# Patient Record
Sex: FEMALE | Employment: UNEMPLOYED | URBAN - METROPOLITAN AREA
[De-identification: names, ages, dates, MRNs, and addresses within clinical notes are randomized per-mention and may not be internally consistent; named-entity substitution may affect disease eponyms.]

---

## 2019-09-15 NOTE — PROGRESS NOTES
Chief Complaint: PAP smear, establish care   Source: self     HPI:  Maria Esther Bonilla is a 53 y.o. female presenting to establish care and for preventative healthcare. Pelvic Pain  - occurs sometimes after sex x 6 months  - also has randomnly at night and feels like a tugging sensation  - periods have become   - last PAP 3 years ago was WNL      Age at which menses began: 13  Last menstrual period was 9/3/19  Length of periods: 3 days   Number of days between periods: irregular every 3 weeks. Previously every 30 days, now every 21-24 days. Menstrual flow: normal, 4 pads in a day; heavier than previously     [id number]    G1 - miscarriage at 5 months  G2 -  at term  G3 - miscarriage at 6 weeks  G4 - miscarriage at 12 weeks  G5 - ectopic at 3 months  with Right tubal ligation    Sexually active?: yes  Number of sexual partners:  one  Type of sexual partners: male  Method of family planning: none    Diet: has breakfast cereal + coffee, lunch - sandwich, dinner - large dinner with meat, rice, beans, tortillas    Exercise: very little, active job cleaning houses       Allergies- reviewed:   No Known Allergies      Medications- reviewed:   No current outpatient medications on file. No current facility-administered medications for this visit.           Past Medical History- reviewed:  Past Medical History:   Diagnosis Date    Hypercholesterolemia     diagnosed at wellness visit ~ ; not on therapy         Past Surgical History- reviewed:   Past Surgical History:   Procedure Laterality Date    HX CHOLECYSTECTOMY      HX GYN      ectopic pregnancy  with R tubal repair    HX ORTHOPAEDIC      bone taken from hip and repaired right arm         Family History - reviewed:  Family History   Problem Relation Age of Onset    Hypertension Mother     Cancer Father     Diabetes Maternal Uncle          Social History - reviewed:  Social History     Socioeconomic History    Marital status: UNKNOWN     Spouse name: Not on file    Number of children: Not on file    Years of education: Not on file    Highest education level: Not on file   Occupational History    Not on file   Social Needs    Financial resource strain: Not on file    Food insecurity:     Worry: Not on file     Inability: Not on file    Transportation needs:     Medical: Not on file     Non-medical: Not on file   Tobacco Use    Smoking status: Former Smoker     Last attempt to quit:      Years since quittin.7    Tobacco comment: infrequently for one week at a time ~ 1 pack/week   Substance and Sexual Activity    Alcohol use: Yes     Comment: 1X per month     Drug use: Not on file    Sexual activity: Yes   Lifestyle    Physical activity:     Days per week: Not on file     Minutes per session: Not on file    Stress: Not on file   Relationships    Social connections:     Talks on phone: Not on file     Gets together: Not on file     Attends [employer]ology service: Not on file     Active member of club or organization: Not on file     Attends meetings of clubs or organizations: Not on file     Relationship status: Not on file    Intimate partner violence:     Fear of current or ex partner: Not on file     Emotionally abused: Not on file     Physically abused: Not on file     Forced sexual activity: Not on file   Other Topics Concern    Not on file   Social History Narrative    Not on file         Immunizations - reviewed:   Immunization History   Administered Date(s) Administered    Influenza Vaccine (Quad) PF 2019     Flu: today  Tdap: last 5 years ago  Pneumovax: not indicated   Zostervax: not indicated       Health Maintenance reviewed -  Pap smear Due today  Mammogram - last one 8 months ago; inconclusive per patient; need to obtain records   Colonoscopy never, not indicated   DEXA scan never, not indicated   HIV testing negative with pregnancies   Hepatitis C testing never, not indicated   Lung cancer screening never, not indicated Review of Systems   Constitutional: Negative for malaise/fatigue. HENT: Negative for congestion and sinus pain. Eyes: Negative for blurred vision and double vision. Respiratory: Negative for cough. Cardiovascular: Negative for chest pain and palpitations. Gastrointestinal: Negative for abdominal pain, constipation and diarrhea. Genitourinary: Negative for dysuria and urgency. Musculoskeletal: Negative for falls and myalgias. Skin: Negative for itching and rash. Hair loss   Neurological: Negative for dizziness, weakness and headaches. Psychiatric/Behavioral: Negative for depression. The patient is not nervous/anxious.     BREASTS: No masses or nipple discharge      Physical Exam  Visit Vitals  /65   Pulse 83   Temp 96.8 °F (36 °C) (Oral)   Resp 16   Ht 5' 0.5\" (1.537 m)   Wt 168 lb (76.2 kg)   SpO2 97%   BMI 32.27 kg/m²       General appearance - alert, well appearing, and in no distress, oriented to person, place, and time and overweight  Eyes - pupils equal and reactive, extraocular eye movements intact  Ears - bilateral TM's and external ear canals normal  Nose - normal and patent, no erythema, discharge or polyps  Mouth - mucous membranes moist, pharynx normal without lesions  Neck - supple, no significant adenopathy, thyroid exam: thyroid is normal in size without nodules or tenderness  Chest - clear to auscultation, no wheezes, rales or rhonchi, symmetric air entry  Heart - normal rate, regular rhythm, normal S1, S2, no murmurs, rubs, clicks or gallops  Abdomen - soft, nontender, nondistended, no masses or organomegaly  Neurological - alert, oriented, normal speech, no focal findings or movement disorder noted  Musculoskeletal - no joint tenderness, deformity or swelling, full range of motion without pain  Extremities - peripheral pulses normal, no pedal edema, no clubbing or cyanosis  Skin - normal coloration and turgor, no rashes, no suspicious skin lesions noted  Pelvic - Exam chaperoned by Mayda Flood LPN. External genitalia normal without rashes or lesions. Pink vaginal mucosa. Moderate rugae of vaginal side walls with scant lubrication. Scant white discharge. Cervix without lesions or abnormal discharge. Uterus non tender and normal size. No adnexal masses or tenderness. Breast exam deferred. The American Cancer Society recommends not performing clinical breast examination, given the potential for false-positive findings and lack of evidence for improved outcomes. The USPSTF states that evidence is insufficient to assess additional benefits of clinical breast examination beyond mammography. Assessment/Plan:    Mrs Nik Juan is a 51yo F without significant past medical history here today to establish care. Current concern about pain with intercourse and at times general vaginal pain as well as more frequent menses. Given patient's age likely pain and abnormal menstruation due to perimenopausal state. UPT today negative. Given history of hair loss with check TSH As well as CBC, Prolactin and TSH/LH. DDX includes uterine abnormality such as fibroid, perimenopause, decrease in vaginal estrogen, abnormal ovulation. Patient to release previous medical records to our office to review necessary health maintenance including recent mammography which was not ordered today because patient had imagining recently at outside facility. ICD-10-CM ICD-9-CM    1. Abnormal uterine bleeding N93.9 626.9 PROLACTIN      TSH 3RD GENERATION      FSH AND LH      CBC W/O DIFF      PROTHROMBIN TIME + INR      AMB POC URINE PREGNANCY TEST, VISUAL COLOR COMPARISON      HEMOGLOBIN A1C WITH EAG      PAP IG, APTIMA HPV AND RFX 16/18,45 ([id number])   2. Wellness examination Z00.00 V70.0 CBC W/O DIFF      LIPID PANEL      METABOLIC PANEL, COMPREHENSIVE      PAP IG, APTIMA HPV AND RFX 16/18,45 ([id number])   3.  Encounter for immunization Z23 V03.89 INFLUENZA VIRUS VAC QUAD,SPLIT,PRESV FREE SYRINGE IM ADMIN INFLUENZA VIRUS VAC       · Counseled re: diet, exercise, healthy lifestyle    · Appropriate labs, vaccines, imaging studies, and referrals ordered as listed above    · Discussed the patient's BMI with her. The BMI follow up plan is as follows: I have counseled this patient on diet and exercise regimens. · The patient was counseled on the dangers of tobacco use, and was advised to quit. Reviewed strategies to maximize success, including written materials. Follow-up and Dispositions    · Return if symptoms worsen or fail to improve. I have discussed the diagnosis with the patient and the intended plan as seen in the above orders. Patient verbalized understanding of the plan and agrees with the plan. The patient has received an after-visit summary and questions were answered concerning future plans. I have discussed medication side effects and warnings with the patient as well. Informed patient to return to the office if new symptoms arise. Patient discussed with Dr. Josie Woodward MD supervising physician.     Maribel Fuentes MD  Family Medicine Resident

## 2019-09-16 ENCOUNTER — OFFICE VISIT (OUTPATIENT)
Dept: FAMILY MEDICINE CLINIC | Age: 44
End: 2019-09-16

## 2019-09-16 ENCOUNTER — HOSPITAL ENCOUNTER (OUTPATIENT)
Dept: LAB | Age: 44
Discharge: HOME | End: 2019-09-16
Payer: SELF-PAY

## 2019-09-16 VITALS
DIASTOLIC BLOOD PRESSURE: 65 MMHG | BODY MASS INDEX: 31.72 KG/M2 | HEIGHT: 61 IN | WEIGHT: 168 LBS | TEMPERATURE: 96.8 F | OXYGEN SATURATION: 97 % | RESPIRATION RATE: 16 BRPM | SYSTOLIC BLOOD PRESSURE: 133 MMHG | HEART RATE: 83 BPM

## 2019-09-16 DIAGNOSIS — R10.2 VAGINAL PAIN: ICD-10-CM

## 2019-09-16 DIAGNOSIS — N93.9 ABNORMAL UTERINE BLEEDING: Primary | ICD-10-CM

## 2019-09-16 DIAGNOSIS — Z00.00 WELLNESS EXAMINATION: ICD-10-CM

## 2019-09-16 DIAGNOSIS — Z23 ENCOUNTER FOR IMMUNIZATION: ICD-10-CM

## 2019-09-16 LAB
HCG URINE, QL. (POC): NEGATIVE
VALID INTERNAL CONTROL?: YES

## 2019-09-16 PROCEDURE — 88175 CYTOPATH C/V AUTO FLUID REDO: CPT

## 2019-09-16 PROCEDURE — 87624 HPV HI-RISK TYP POOLED RSLT: CPT

## 2019-09-16 NOTE — PATIENT INSTRUCTIONS
Please complete a release of medical records form up front. Depending on your lab results I may have you return for followup in the next few weeks. Aprenda acerca de la menopausia - [ Learning About Menopause ]  ¿Qué es la menopausia? Para la mayoría de [facility], la menopausia es un proceso natural de envejecimiento. Los períodos menstruales gradualmente se detienen. La capacidad para quedar [place] Brenda a patricio fin. Algunas mujeres sienten alivio cuando melanie años de maternidad terminan. Anshu otras luchan con los cambios físicos y emocionales que vienen con la menopausia. En la [employer], la menopausia se presenta a alrededor de los 48 Los davina. Anshu el cuerpo de cada john tiene patricio propio desarrollo cronológico. Algunas mujeres poornima de tener melanie períodos en la mitad de la década de los 36 años de [place]. Otras los siguen teniendo hasta usama [employer] 48. Y algunas mujeres pasan por la menopausia antes debido a un tratamiento contra el cáncer o a jluis cirugía para extraer los ovarios. ¿Qué puede esperar con la menopausia? · Comienza con la perimenopausia. [place] es el proceso de cambio que conduce a la menopausia. La perimenopausia puede empezar ya hacia finales de los 30 años o no aparecer hasta principios de los 48. Patricio duración varía, anshu suele durar entre 2 y [address] · Angelo alisia proceso, melanie niveles hormonales ascenderán y descenderán de manera desigual (fluctuarán). [place] causa alteraciones en melanie períodos y otros síntomas. Con el tiempo, los niveles de estrógeno y de progesterona descienden lo suficiente maikel para que el ciclo menstrual se detenga. Un año completo sin tener un período generalmente se considera maikel menopausia. · Los niveles bajos de estrógeno después de la menopausia aceleran la pérdida de masa ósea. [place] aumenta patricio riesgo de osteoporosis. Además, el riesgo de tener jluis enfermedad cardíaca aumenta después de la menopausia.   · Es normal que la piel se afine y esté más reseca y [place] después de la menopausia. El revestimiento vaginal y las vías urinarias inferiores también pierden tejido y se debilitan. [place] puede hacer que sea difícil tener relaciones sexuales. También puede aumentar el riesgo de infecciones vaginales y [place]. ¿Cuáles son los síntomas? · Períodos más ligeros o más abundantes. Patricio ciclo menstrual puede ser Oksana Balk corto. Es posible que se salte períodos. · Bochornos. Puede tener jluis sensación repentina de calor intenso. Puede sudar y [employer] itzel, el johanny y el pecho enrojecidos. Además de bochornos, usted puede tener latidos del corazón demasiado rápidos o irregulares. Puede sentirse ansiosa o malhumorada. En casos raros, puede sentir pánico.  · Problemas para dormir. · Cambios repentinos del estado de ánimo o sentirse malhumorada, deprimida o preocupada. · Problemas para recordar o pensar con claridad. · Sequedad vaginal.  Algunas mujeres tienen solo unos pocos síntomas leves. Otras tienen síntomas graves que perturban el sueño y la marylu diaria. Los síntomas tienden a durar o a empeorar el primer año o más después de la menopausia. Con el tiempo, las hormonas se estabilizan a niveles bajos. Muchos síntomas mejoran o desaparecen. Sin embargo, algunas mujeres pueden tener síntomas que no desaparecen. ¿Cómo se tratan los síntomas de la menopausia?   Si tiene síntomas leves, puede obtener algo de alivio si come alimentos saludables, hace ejercicio y reduce el estrés. Algunas mujeres optan por michelle medicamentos si tienen síntomas graves que no se alivian haciendo cambios en patricio estilo de marylu.   Cambios en el estilo de marylu    · Opte por jluis dieta saludable para el corazón que sea baja en grasas saturadas. Debería incluir mucho pescado, frutas, verduras, frijoles y granos y panes ricos en fibra. Asegúrese de obtener suficiente calcio y vitamina D para ayudar a [facility].  Los productos lácteos bajos en grasa o sin [place] son [place] excelente johnnie de calcio.     · Meryl ejercicio con regularidad. El ejercicio puede ayudarla a manejar patricio peso, mantener el corazón y los huesos maeknzie y levantarle el ánimo.     · Limite la cafeína, el alcohol y el estrés. Estas cosas podrían empeorar los síntomas. Limitar patricio consumo podría ayudarla a dormir mejor.     · Si fuma, deje de hacerlo. Dejar de fumar puede reducir los bochornos y los riesgos para la ryder a Fanny Mejía. Medicamentos   Si melanie síntomas son graves, hable con patricio [place] probar medicamentos recetados, tales maikel:    · Pastillas anticonceptivas de dosis baja antes de la menopausia.     · Terapia hormonal (HT, por melanie siglas en inglés) de dosis baja después de la menopausia.     · Antidepresivos.     · Un medicamento llamado clonidina ([place]), que suele usarse para tratar la presión arterial kade.    Todos los medicamentos para los síntomas de la menopausia tienen posibles riesgos o efectos secundarios. Jluis cantidad muy pequeña de mujeres tienen problemas de ryder graves cuando reciben terapia hormonal. Asegúrese de hablar con patricio médico acerca de los posibles riesgos de ryder antes de comenzar un tratamiento para los síntomas de la menopausia.   Otros tratamientos   Puede probar:    · Ejercicios de respiración. Estos pueden ayudarla a reducir los bochornos y los síntomas emocionales.     · Soya. Algunas mujeres creen que comer mucha soya les ayuda a estabilizar los síntomas de la menopausia.     · Yoga o biorretroalimentación. Estos pueden ayudar a reducir el estrés. La atención de seguimiento es jluis parte clave de patricio tratamiento y seguridad. Asegúrese de hacer y acudir a todas las citas, y llame a patricio médico si está teniendo problemas. También es jluis buena idea saber los resultados de melanie exámenes y mantener jluis lista de los medicamentos que katie. ¿Dónde puede encontrar más información en inglés? Carolyne Andrews a http://sonia-maximus.info/.   Gilbert Spence H199 en la búsqueda para aprender más acerca de \"Aprenda acerca de la menopausia - [ Learning About Menopause ]. \"  Revisado: 19 febrero, 2019  Versión del contenido: 12.1  © 4206-2866 Healthwise, Incorporated. Las instrucciones de cuidado fueron adaptadas bajo licencia por [employer] (which disclaims liability or warranty for this information). Si usted tiene [place] afección médica o sobre estas instrucciones, siempre pregunte a patricio profesional de ryder. Healthwise, Incorporated niega toda garantía o responsabilidad por patricio uso de esta información. Sangrado uterino anormal: Instrucciones de cuidado - [ Abnormal Uterine Bleeding: Care Instructions ]  Instrucciones de cuidado    El sangrado uterino anormal (AUB, por melanie siglas en inglés) es un sangrado irregular del útero que dura más o es más intenso de lo normal o que no ocurre en el momento habitual para usted. A veces, se debe a cambios en los niveles hormonales. También puede estar causado por crecimientos en el útero, tales maikel fibromas o pólipos. A veces no se puede encontrar la causa. Podría tener mucho sangrado cuando no está esperando patricio período. Patricio médico puede sugerirle jluis prueba de embarazo si lucinda que está [place]. La atención de seguimiento es jluis parte clave de patricio tratamiento y seguridad. Asegúrese de hacer y acudir a todas las citas, y llame a patricio médico si está teniendo problemas. También es jluis buena idea saber los resultados de melanie exámenes y mantener jluis lista de los medicamentos que katie. ¿Cómo puede cuidarse en el hogar? · Sea wei con los medicamentos. [place] los analgésicos (medicamentos para el dolor) exactamente maikel le fueron indicados. ? Si el médico le recetó un analgésico, tómelo según las indicaciones. ? Si no está tomando un analgésico recetado, pregúntele a patricio médico si puede michelle philip de [employer]. · Podría faltarle wilmer por la pérdida de ade.  Coma jluis dieta equilibrada con alto contenido de wilmer y vitamina C. Entre los alimentos ricos en wilmer se encuentran las saturnino steven, los [place], los [place], los frijoles (habichuelas) y las verduras de hojas verdes. Pregúntele a patricio médico si necesita michelle pastillas de wilmer o un multivitamínico.  ¿Cuándo debe pedir ayuda? Llame al 911 en cualquier momento que considere que necesita atención de [place]. Por ejemplo, llame si:    · Se desmayó (perdió el conocimiento).    Llame a patricio médico ahora mismo o busque atención médica inmediata si:    · Tiene dolor repentino e intenso en el abdomen o la pelvis.     · Tiene sangrado vaginal intenso. Empapa melanie toallas sanitarias o tampones habituales cada hora constance 2 o más horas.     · Siente mareos o aturdimiento, o que está a punto de desmayarse.    Preste especial atención a los cambios en patricio ryder y asegúrese de comunicarse con patricio médico si:    · Tiene un dolor nuevo en el abdomen o la pelvis.     · Tiene fiebre.     · El sangrado empeora o dura más de 1 semana.     · Piensa que podría estar embarazada. ¿Dónde puede encontrar más información en inglés? Michela Spina a http://sonia-maximus.info/. Raman Cosme O796 en la búsqueda para aprender más acerca de \"Sangrado uterino anormal: Instrucciones de cuidado - [ Abnormal Uterine Bleeding: Care Instructions ]. \"  Revisado: 19 febrero, 2019  Versión del contenido: 12.1  © 7980-8828 Healthwise, Incorporated. Las instrucciones de cuidado fueron adaptadas bajo licencia por [employer] (which disclaims liability or warranty for this information). Si usted tiene [place] afección médica o sobre estas instrucciones, siempre pregunte a patricio profesional de ryder. [facility] niega toda garantía o responsabilidad por patricio uso de esta información.

## 2019-09-16 NOTE — PROGRESS NOTES
Chief Complaint   Patient presents with   [facility]    Pelvic Pain     times 6 months     1. Have you been to the ER, urgent care clinic since your last visit? Hospitalized since your last visit? N/A    2. Have you seen or consulted any other health care providers outside of the [address] [place] [address] since your last visit? Include any pap smears or colon screening.  N/A

## 2019-09-17 LAB
ALBUMIN SERPL-MCNC: 4.3 G/DL (ref 3.5–5.5)
ALBUMIN/GLOB SERPL: 1.6 {RATIO} (ref 1.2–2.2)
ALP SERPL-CCNC: 74 IU/L (ref 39–117)
ALT SERPL-CCNC: 33 IU/L (ref 0–32)
AST SERPL-CCNC: 19 IU/L (ref 0–40)
BILIRUB SERPL-MCNC: 0.2 MG/DL (ref 0–1.2)
BUN SERPL-MCNC: 14 MG/DL (ref 6–24)
BUN/CREAT SERPL: 26 (ref 9–23)
CALCIUM SERPL-MCNC: 9.3 MG/DL (ref 8.7–10.2)
CHLORIDE SERPL-SCNC: 103 MMOL/L (ref 96–106)
CHOLEST SERPL-MCNC: 227 MG/DL (ref 100–199)
CO2 SERPL-SCNC: 21 MMOL/L (ref 20–29)
CREAT SERPL-MCNC: 0.53 MG/DL (ref 0.57–1)
ERYTHROCYTE [DISTWIDTH] IN BLOOD BY AUTOMATED COUNT: 12.9 % (ref 12.3–15.4)
EST. AVERAGE GLUCOSE BLD GHB EST-MCNC: 111 MG/DL
FSH SERPL-ACNC: 8.8 MIU/ML
GLOBULIN SER CALC-MCNC: 2.7 G/DL (ref 1.5–4.5)
GLUCOSE SERPL-MCNC: 90 MG/DL (ref 65–99)
HBA1C MFR BLD: 5.5 % (ref 4.8–5.6)
HCT VFR BLD AUTO: 39.9 % (ref 34–46.6)
HDLC SERPL-MCNC: 45 MG/DL
HGB BLD-MCNC: 13.3 G/DL (ref 11.1–15.9)
INR PPP: 1 (ref 0.8–1.2)
INTERPRETATION, 910389: NORMAL
LDLC SERPL CALC-MCNC: 143 MG/DL (ref 0–99)
LH SERPL-ACNC: 6.2 MIU/ML
MCH RBC QN AUTO: 28.8 PG (ref 26.6–33)
MCHC RBC AUTO-ENTMCNC: 33.3 G/DL (ref 31.5–35.7)
MCV RBC AUTO: 86 FL (ref 79–97)
PLATELET # BLD AUTO: 366 X10E3/UL (ref 150–450)
POTASSIUM SERPL-SCNC: 4.7 MMOL/L (ref 3.5–5.2)
PROLACTIN SERPL-MCNC: 21.5 NG/ML (ref 4.8–23.3)
PROT SERPL-MCNC: 7 G/DL (ref 6–8.5)
PROTHROMBIN TIME: 10.5 SEC (ref 9.1–12)
RBC # BLD AUTO: 4.62 X10E6/UL (ref 3.77–5.28)
SODIUM SERPL-SCNC: 138 MMOL/L (ref 134–144)
TRIGL SERPL-MCNC: 193 MG/DL (ref 0–149)
TSH SERPL DL<=0.005 MIU/L-ACNC: 2.37 UIU/ML (ref 0.45–4.5)
VLDLC SERPL CALC-MCNC: 39 MG/DL (ref 5–40)
WBC # BLD AUTO: 9.1 X10E3/UL (ref 3.4–10.8)

## 2019-09-20 ENCOUNTER — TELEPHONE (OUTPATIENT)
Dept: FAMILY MEDICINE CLINIC | Age: 44
End: 2019-09-20

## 2019-09-20 NOTE — TELEPHONE ENCOUNTER
Called patient to discuss results.  No answer and no opportunity to leave VM    Assisted with phone call by Ester# [id number]

## 2019-09-20 NOTE — PROGRESS NOTES
PAP NILM, HPV neg - repeat in 5 years  Prolactin, TSH, FSH/LH, CBC WNL, Coag studies WNL  Lipid Panel - ASCVD Risk 10 year 1.7% - no need to start statin  CMP WNL  HbA1c 5.5%    Patient called no answer, no VM. Letter sent.

## 2019-10-03 ENCOUNTER — DOCUMENTATION ONLY (OUTPATIENT)
Dept: FAMILY MEDICINE CLINIC | Age: 44
End: 2019-10-03

## 2019-10-03 NOTE — PROGRESS NOTES
Review of Previous Medical Records  - PAP 2015 NILM + HPV neg  - 2015 mammogram without concerning findings per patient   - 4/ 2019 mammogram without evidence of malignancy- UTD  - negative HIV testing 4/2017

## 2021-11-19 ENCOUNTER — TRANSCRIBE ORDER (OUTPATIENT)
Dept: SCHEDULING | Age: 46
End: 2021-11-19

## 2021-11-19 DIAGNOSIS — N63.10 BREAST MASS, RIGHT: Primary | ICD-10-CM

## 2021-11-22 ENCOUNTER — TELEPHONE (OUTPATIENT)
Dept: FAMILY PLANNING/WOMEN'S HEALTH CLINIC | Age: 46
End: 2021-11-22

## 2021-11-23 ENCOUNTER — HOSPITAL ENCOUNTER (OUTPATIENT)
Dept: LAB | Age: 46
Discharge: HOME | End: 2021-11-23

## 2021-11-23 ENCOUNTER — OFFICE VISIT (OUTPATIENT)
Dept: FAMILY PLANNING/WOMEN'S HEALTH CLINIC | Age: 46
End: 2021-11-23

## 2021-11-23 ENCOUNTER — HOSPITAL ENCOUNTER (OUTPATIENT)
Dept: MAMMOGRAPHY | Age: 46
Discharge: HOME | End: 2021-11-23

## 2021-11-23 DIAGNOSIS — N63.10 BREAST MASS, RIGHT: ICD-10-CM

## 2021-11-23 DIAGNOSIS — Z01.419 ENCOUNTER FOR GYNECOLOGICAL EXAMINATION: ICD-10-CM

## 2021-11-23 DIAGNOSIS — Z12.31 SCREENING MAMMOGRAM FOR BREAST CANCER: Primary | ICD-10-CM

## 2021-11-23 PROCEDURE — 87624 HPV HI-RISK TYP POOLED RSLT: CPT

## 2021-11-23 PROCEDURE — 88175 CYTOPATH C/V AUTO FLUID REDO: CPT

## 2021-11-23 PROCEDURE — 76642 ULTRASOUND BREAST LIMITED: CPT

## 2021-11-23 PROCEDURE — 77062 BREAST TOMOSYNTHESIS BI: CPT

## 2021-11-23 NOTE — PROGRESS NOTES
HISTORY OF PRESENT ILLNESS  Mariia Yepez is a 48 y.o. female here for EWL. HPI Ms Jefferson Avila, had a lump in her right breast but she cannot feel it now. Her last mammogram was 3 years ago at [facility]. She brought those records with her. She denies bilateral nipple discharge, denies skin changes/dimpling/retraction. LMP 11/4/2021. Denies use of hormones. She denies pelvic pain and bloating. She gets on/off white vaginal discharge. She will try OTC Monistat. She has had some urine stress incontinence lately as well. Review of Systems   Constitutional: Negative. Respiratory: Negative. Cardiovascular: Negative. Gastrointestinal: Negative. Genitourinary: Negative. Skin: Negative. Physical Exam  Nursing note reviewed. Constitutional:       Appearance: Normal appearance. Chest:   Breasts:      Right: No swelling, bleeding, inverted nipple, mass, nipple discharge, skin change, tenderness, axillary adenopathy or supraclavicular adenopathy. Left: No swelling, bleeding, inverted nipple, mass, nipple discharge, skin change, tenderness, axillary adenopathy or supraclavicular adenopathy. Genitourinary:     Labia:         Right: No rash, tenderness or lesion. Left: No rash, tenderness or lesion. Urethra: No prolapse, urethral pain, urethral swelling or urethral lesion. Vagina: Normal.      Cervix: Normal.      Uterus: Normal.       Adnexa: Right adnexa normal and left adnexa normal.        Right: No mass or tenderness. Left: No mass or tenderness. Rectum: Normal. Guaiac result negative. Lymphadenopathy:      Upper Body:      Right upper body: No supraclavicular, axillary or pectoral adenopathy. Left upper body: No supraclavicular, axillary or pectoral adenopathy. Lower Body: No right inguinal adenopathy. No left inguinal adenopathy. Skin:     General: Skin is warm and dry.    Neurological:      Mental Status: She is alert and oriented to person, place, and time. Psychiatric:         Mood and Affect: Mood normal.         Behavior: Behavior normal.         Thought Content: Thought content normal.         Judgment: Judgment normal.         ASSESSMENT and PLAN  1. EWL  2. CBE benign      -unable to palpate a mass in the right breast  3. Screening mammogram today  4. Pap Thin Prep w/HPV cotesting obtained  5. Pelvic floor exercises/Kegal discussed  6.  CVAN info provided

## 2021-11-23 NOTE — PROGRESS NOTES
EVERY WOMANS LIFE HISTORY QUESTIONNAIRE       No Yes Comments   Has a doctor ever seen or felt anything wrong with your breast? []                                  [x]                                  Pt felt something to right breast, saw  and they confirmed, lump to right breast   Have you ever had a breast biopsy? [x]                                  []                                          When and where was last mammogram performed? 3 years. Pt brought disc from [facility] with her today. Have you ever been told that there was a problem on your mammogram?   No Yes Comments   [x]                                  []                                       Do you have breast implants? No Yes Comments   [x]                                  []                                       When was your last Pap test performed? 2017    Have you ever had an abnormal Pap test?   No Yes Comments   [x]                                  []                                       Have you had a hysterectomy? No Yes Comments (why)   [x]                                  []                                       Have you been through menopause? No Yes Date of LMP   [x]                                  []                                  11/4/21     Did your mother take LORENA? No Yes Unknown   [x]                                  []                                       Do you have a history of HIV exposure? No Yes    [x]                                  []                                       Have you ever been diagnosed with any type of Cancer   No Yes Comments (type,when,where,type of treatment   []                                  []                                          Has a family member been diagnosed with breast or ovarian cancer?    No Yes Comments (which family members, and type   [x]                                  []                                       Are you taking hormone replacement therapy (HRT)     No Yes Comments   []                                  []                                       How many times have you been pregnant? 4        Number of live births ? 1    Are you experiencing any of the following? No Yes Comments   Nipple Discharge [x]                                  []                                     Breast Lump/Masses []                                  [x]                                  Right breast   Breast Skin Changes [x]                                  []                                          No Yes Comments   Vaginal Discharge []                                  [x]                                  Foul odor, some white discharge but also has urine leak. Pt not able to hold urine. No pain with urination. Lower back pain. Pain to rectal area. Abnormal/unusual vaginal bleeding [x]                                  []                                         Are you experiencing any other health problems? Pain to both breasts to starting period. Pt states she did not feel good, had breast pain and felt lump to right breast.    Age at first period?  13  Age at first birth?17    Ht--5'1\"    Wt--170lbs

## 2021-11-24 ENCOUNTER — TELEPHONE (OUTPATIENT)
Dept: FAMILY PLANNING/WOMEN'S HEALTH CLINIC | Age: 46
End: 2021-11-24

## 2021-11-24 NOTE — TELEPHONE ENCOUNTER
Patient was called to follow up on needing breast biopsy. Pt states that she is a ware of the breast US and Dx mammogram  Results. Is aware that someone from the mammogram suite will be calling to schedule the breast biopsy.    Patient thankful for the [facility] program. Yogesh Mendoza RN

## 2021-11-29 DIAGNOSIS — N63.0 BREAST LUMP: Primary | ICD-10-CM

## 2021-12-09 ENCOUNTER — HOSPITAL ENCOUNTER (OUTPATIENT)
Dept: MAMMOGRAPHY | Age: 46
Discharge: HOME | End: 2021-12-09

## 2021-12-09 DIAGNOSIS — R92.8 ABNORMAL MAMMOGRAM: ICD-10-CM

## 2021-12-09 DIAGNOSIS — N63.0 BREAST LUMP: ICD-10-CM

## 2021-12-09 PROCEDURE — 77061 BREAST TOMOSYNTHESIS UNI: CPT

## 2021-12-09 PROCEDURE — 88305 TISSUE EXAM BY PATHOLOGIST: CPT

## 2021-12-09 PROCEDURE — 77030041975 US BX BREAST RT 1ST LESION W/CLIP AND SPECIMEN

## 2021-12-16 ENCOUNTER — TELEPHONE (OUTPATIENT)
Dept: SURGERY | Age: 46
End: 2021-12-16

## 2021-12-16 ENCOUNTER — OFFICE VISIT (OUTPATIENT)
Dept: SURGERY | Age: 46
End: 2021-12-16
Payer: COMMERCIAL

## 2021-12-16 VITALS
HEIGHT: 61 IN | SYSTOLIC BLOOD PRESSURE: 127 MMHG | WEIGHT: 174 LBS | DIASTOLIC BLOOD PRESSURE: 94 MMHG | BODY MASS INDEX: 32.85 KG/M2 | HEART RATE: 84 BPM

## 2021-12-16 DIAGNOSIS — N63.10 BREAST MASS, RIGHT: Primary | ICD-10-CM

## 2021-12-16 PROCEDURE — 99203 OFFICE O/P NEW LOW 30 MIN: CPT | Performed by: SURGERY

## 2021-12-16 NOTE — PROGRESS NOTES
HISTORY OF PRESENT ILLNESS  Kerri Mauricio is a 48 y.o. female. HPI NEW patient consult referred by Dr. Mj Vyas for RIGHT breast mass which pt noticed 6 weeks ago. Pt had a mammogram /US and biopsy which shows PASH. Bruised from the biopsy. [facility] [place] interpreted. Family History: Mother  from stomach cancer  2021    Father had thyroid cancer,  from other causes     [facility] Results (most recent):  Results from [place] encounter on 21    [facility] 3D RAFAEL W MAMMO RT DX INCL CAD    Narrative  ULTRASOUND-GUIDED RIGHT BREAST CORE BIOPSY WITH CLIP PLACEMENT AND DIGITAL  UNILATERAL RAFAEL SYNTHESIS RIGHT MAMMOGRAM:    INDICATION: Right breast 8:00 mass. Mrs Angela Valles  placed LPO on the ultrasound table . Right breast skin cleansed with Chloro-prep and draped in sterile fashion. Local and deep tissue anesthesia  obtained with subcutaneous injection of 10 cc of 1% lidocaine with epinephrine. A small skin incision was made through which the 14-gauge Sertera needle was placed. Using ultrasound guidance 3, 14 gauge core biopsies were obtained and placed in formalin. . The biopsy needle was withdrawn. A microclip was placed  with the introducer, and introducer withdrawn. Hemostasis obtained. Wound cleansed and dressed. Estimated blood loss: 2 mL . Images of the procedure saved in the PACs. Post-procedural mammogram demonstrates clip at the expected site. Mrs Angela Valles  tolerated the procedure well without immediate complications. She  was given written and verbal post-care instructions. 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 Histology and mammography/sonography are concordant. Histology cannot be high  risk, recommend surgical referral for consideration of excision. RESULT CODE: ACR BIRADS category 2 benign.     FOLLOWUP CODE: Surgical referral.    Past Medical History:   Diagnosis Date    Chronic pain     pain in stomach from gallbladder stones    Ill-defined condition     elevated cholesterol     Past Surgical History:   Procedure Laterality Date    HX GYN  2004    ectopic pregnancy    HX ORTHOPAEDIC  2001    fx R arm    HX OTHER SURGICAL      liposuction      OB History        1    Para   1    Term   1            AB        Living           SAB        IAB        Ectopic        Molar        Multiple        Live Births   1          Obstetric Comments   Menarche 13, LMP 12/10/21, # of children 1, age of 4st delivery 21, Hysterectomy/oophorectomy no/no, Breast bx yes RIGHT breast 19, history of breast feeding no, BCP no, Hormone therapy no           No family history on file. Social History     Tobacco Use    Smoking status: Never Smoker    Smokeless tobacco: Not on file   Substance Use Topics    Alcohol use: No      Prior to Admission medications    Not on File      No Known Allergies      Review of Systems   Constitutional: Positive for malaise/fatigue. HENT: Positive for hearing loss. Eyes: Positive for blurred vision. Gastrointestinal: Positive for abdominal pain and diarrhea. All other systems reviewed and are negative. Physical Exam  Constitutional:       Appearance: She is well-developed. Cardiovascular:      Rate and Rhythm: Normal rate and regular rhythm. Heart sounds: Normal heart sounds. Pulmonary:      Effort: Pulmonary effort is normal.      Breath sounds: Normal breath sounds. Chest:   Breasts: Breasts are symmetrical.      Right: No swelling, mass, nipple discharge, skin change, tenderness, axillary adenopathy or supraclavicular adenopathy. Left: No swelling, mass, nipple discharge, skin change, tenderness, axillary adenopathy or supraclavicular adenopathy. Lymphadenopathy:      Upper Body:      Right upper body: No supraclavicular or axillary adenopathy. Left upper body: No supraclavicular or axillary adenopathy. Skin:     General: Skin is warm and dry. Neurological:      Mental Status: She is alert and oriented to person, place, and time. ASSESSMENT and PLAN    ICD-10-CM ICD-9-CM    1. Breast mass, right  N63.10 611.72      Total time spent with patient: 30 minutes     RIGHT breast mass, SAW     Discussed surgery and post operative expectations:  a. Outpatient surgery with sedation. b. Surgery will take about 45 minutes, then an hour in the recovery room. c. The wound is closed with dissolving sutures and skin glue. It is okay to shower after surgery. d. Swelling and bruising are normal and can last up to 2 weeks. e.  May resume normal activities the day after surgery, but may have 1-2 weeks of pain and/or fatigue  f. Pain is usually well-controlled with ibuprofen or acetaminophen. Hydrocodone may be prescribed if needed. g. Infection and bleeding are unlikely but possible complications.     Plan:  RIGHT breast excisional biopsy, January 2022

## 2021-12-16 NOTE — LETTER
12/16/2021    Patient: Kassy Laboy   YOB: 1975   Date of Visit: 12/16/2021     Abdi Wilson MD  [facility]  [facility]    Dear Abdi Wilson MD,      Thank you for referring Ms. Kassy Laboy to [address] for evaluation. My notes for this consultation are attached. If you have questions, please do not hesitate to call me. I look forward to following your patient along with you.       Sincerely,    Concepcion Varela MD

## 2021-12-20 DIAGNOSIS — N63.10 MASS OF RIGHT BREAST: Primary | ICD-10-CM

## 2021-12-21 NOTE — TELEPHONE ENCOUNTER
Patient notified of surgery :  Patient Surgery Information Sheet        Patient Name:  Kyung Gurrola  Surgery Date:  January 5, 2022  Type of Surgery:  Excisional biopsy of right breast.  Time of Surgery:  9:15 am  Pre-Operative Testing Department will call prior to surgery to determine if you need to come in and will schedule if needed. Arrival Time on the day of Surgery: 8:00 am    Hospital:  [facility] [facility]    Check in is located:   [facility] [[facility]]-go to main entrance , take elevator on left side  past volunteer desk  to 2nd floor, turn right out of elevator, sign in at desk    [address]    Pre-Operative Instructions:       Nothing to eat or drink after midnight the night before surgery  Do not wear makeup, lotion, deodorant, perfume  Please have a  to take you home from the hospital, failure to have a  could result in canceled surgery  All valuables should be left at home, the hospital Is not responsible for valuables  Special Instructions if needed:

## 2021-12-30 ENCOUNTER — HOSPITAL ENCOUNTER (OUTPATIENT)
Dept: PREADMISSION TESTING | Age: 46
Discharge: HOME | End: 2021-12-30

## 2021-12-30 PROCEDURE — U0005 INFEC AGEN DETEC AMPLI PROBE: HCPCS

## 2022-01-01 LAB
SARS-COV-2, XPLCVT: DETECTED
SOURCE, COVRS: ABNORMAL

## 2022-01-03 ENCOUNTER — TELEPHONE (OUTPATIENT)
Dept: SURGERY | Age: 47
End: 2022-01-03

## 2022-01-03 NOTE — TELEPHONE ENCOUNTER
----- Message from Deronda Saint, NP sent at 1/2/2022 10:41 PM EST -----  Wanted to let you know that she tested positive for COVID and will need her surgery postponed unless she is an urgent case. Thanks.

## 2022-01-18 DIAGNOSIS — N63.10 MASS OF RIGHT BREAST: Primary | ICD-10-CM

## 2022-01-21 NOTE — TELEPHONE ENCOUNTER
Patient notified to arrive at 9:30 am [facility] 1/26/22;  Nothing to eat or drink after midnight the night before;  nothing on the skin; will need a  to be released.

## 2022-01-21 NOTE — PERIOP NOTES
[address] \[facility] [place] [facility]\"" [address], [address] [facility]   MAIN OR                                  [phone]   MAIN PRE OP                          [phone]                                                                                AMBULATORY PRE OP          [phone]  PRE-ADMISSION TESTING    [phone]   Surgery Date: Wednesday 1/26/22       Is surgery arrival time given by surgeon? YES BY DR Kaitlyn Diggs OFFICE  If Sandra us staff will call you between 3 and 7pm the day before your surgery with your arrival time. (If your surgery is on a Monday, we will call you the Friday before.)    Call [phone] after 7pm Monday-Friday if you did not receive this call. INSTRUCTIONS BEFORE YOUR SURGERY   When You  Arrive Arrive at the 2nd [address] [facility] on the day of your surgery  Have your insurance card, photo ID, and any copayment (if needed)   Food   and   Drink NO food or drink after midnight the night before surgery    This means NO water, gum, mints, coffee, juice, etc.  No alcohol (beer, wine, liquor) 24 hours before and after surgery   Medications to   TAKE   Morning of Surgery MEDICATIONS TO TAKE THE MORNING OF SURGERY WITH A SIP OF WATER:    NONE   Medications  To  STOP      7 days before surgery  Non-Steroidal anti-inflammatory Drugs (NSAID's): for example, Ibuprofen (Advil, Motrin), Naproxen (Aleve)   Aspirin, if taking for pain    Herbal supplements, vitamins, and fish oil   Other:  (Pain medications not listed above, including Tylenol may be taken)   Blood  Thinners  If you take  Aspirin, Plavix, Coumadin, or any blood-thinning or anti-blood clot medicine, talk to the doctor who prescribed the medications for pre-operative instructions.    Bathing Clothing  Jewelry  Valuables      If you shower the morning of surgery, please do not apply anything to your skin (lotions, powders, deodorant, or makeup, especially mascara)   Do not shave or trim anywhere 24 hours before surgery   Wear your hair loose or down; no pony-tails, buns, or metal hair clips   Wear loose, comfortable, clean clothes   Wear glasses instead of contacts  [employer] money, valuables, and jewelry, including body piercings, at home   If you were given an [employer], bring it on day of surgery. Going Home - or Spending the Night  SAME-DAY SURGERY: You must have a responsible adult drive you home and stay with you 24 hours after surgery   ADMITS: If your doctor is keeping you in the hospital after surgery, leave personal belongings/luggage in your car until you have a hospital room number. Hospital discharge time is 12 noon  Drivers must be here before 12 noon unless you are told differently   Special Instructions NONE       Follow all instructions so your surgery wont be cancelled. Please, be on time. If a situation occurs and you are delayed the day of surgery, call [phone] or [phone]. If your physical condition changes (like a fever, cold, flu, etc.) call your surgeon. Home medication(s) reviewed and verified via   PHONE   LIST   [facility]BAL   during PAT appointment. The patient was contacted by  PHONE    IN-PERSON  The patient verbalizes understanding of all instructions and   DOES   DOES NOT   need reinforcement.

## 2022-01-24 DIAGNOSIS — N63.10 BREAST MASS, RIGHT: Primary | ICD-10-CM

## 2022-02-08 ENCOUNTER — ANESTHESIA EVENT (OUTPATIENT)
Dept: SURGERY | Age: 47
End: 2022-02-08
Payer: COMMERCIAL

## 2022-02-09 ENCOUNTER — ANESTHESIA (OUTPATIENT)
Dept: SURGERY | Age: 47
End: 2022-02-09
Payer: COMMERCIAL

## 2022-02-09 ENCOUNTER — HOSPITAL ENCOUNTER (OUTPATIENT)
Age: 47
Setting detail: OUTPATIENT SURGERY
Discharge: HOME | End: 2022-02-09
Attending: SURGERY | Admitting: SURGERY
Payer: COMMERCIAL

## 2022-02-09 VITALS
DIASTOLIC BLOOD PRESSURE: 70 MMHG | TEMPERATURE: 97.6 F | OXYGEN SATURATION: 99 % | HEIGHT: 61 IN | RESPIRATION RATE: 15 BRPM | HEART RATE: 67 BPM | WEIGHT: 179.01 LBS | BODY MASS INDEX: 33.8 KG/M2 | SYSTOLIC BLOOD PRESSURE: 131 MMHG

## 2022-02-09 DIAGNOSIS — N63.10 MASS OF RIGHT BREAST: ICD-10-CM

## 2022-02-09 DIAGNOSIS — N63.10 BREAST MASS, RIGHT: ICD-10-CM

## 2022-02-09 LAB — HCG UR QL: NEGATIVE

## 2022-02-09 PROCEDURE — 74011000250 HC RX REV CODE- 250: Performed by: NURSE ANESTHETIST, CERTIFIED REGISTERED

## 2022-02-09 PROCEDURE — 88307 TISSUE EXAM BY PATHOLOGIST: CPT

## 2022-02-09 PROCEDURE — 74011000250 HC RX REV CODE- 250: Performed by: SURGERY

## 2022-02-09 PROCEDURE — 76030000000 HC AMB SURG OR TIME 0.5 TO 1: Performed by: SURGERY

## 2022-02-09 PROCEDURE — 81025 URINE PREGNANCY TEST: CPT

## 2022-02-09 PROCEDURE — 77030040922 HC BLNKT HYPOTHRM STRY -A

## 2022-02-09 PROCEDURE — 2709999900 HC NON-CHARGEABLE SUPPLY: Performed by: SURGERY

## 2022-02-09 PROCEDURE — 77030040361 HC SLV COMPR DVT MDII -B

## 2022-02-09 PROCEDURE — 76210000034 HC AMBSU PH I REC 0.5 TO 1 HR: Performed by: SURGERY

## 2022-02-09 PROCEDURE — 76060000061 HC AMB SURG ANES 0.5 TO 1 HR: Performed by: SURGERY

## 2022-02-09 PROCEDURE — 74011250636 HC RX REV CODE- 250/636: Performed by: NURSE ANESTHETIST, CERTIFIED REGISTERED

## 2022-02-09 PROCEDURE — 19120 REMOVAL OF BREAST LESION: CPT | Performed by: SURGERY

## 2022-02-09 PROCEDURE — 76210000046 HC AMBSU PH II REC FIRST 0.5 HR: Performed by: SURGERY

## 2022-02-09 PROCEDURE — 74011250636 HC RX REV CODE- 250/636: Performed by: ANESTHESIOLOGY

## 2022-02-09 RX ORDER — PROPOFOL 10 MG/ML
INJECTION, EMULSION INTRAVENOUS
Status: DISCONTINUED | OUTPATIENT
Start: 2022-02-09 | End: 2022-02-09 | Stop reason: HOSPADM

## 2022-02-09 RX ORDER — ONDANSETRON 2 MG/ML
4 INJECTION INTRAMUSCULAR; INTRAVENOUS AS NEEDED
Status: DISCONTINUED | OUTPATIENT
Start: 2022-02-09 | End: 2022-02-09 | Stop reason: HOSPADM

## 2022-02-09 RX ORDER — ONDANSETRON 2 MG/ML
INJECTION INTRAMUSCULAR; INTRAVENOUS AS NEEDED
Status: DISCONTINUED | OUTPATIENT
Start: 2022-02-09 | End: 2022-02-09 | Stop reason: HOSPADM

## 2022-02-09 RX ORDER — SODIUM CHLORIDE 0.9 % (FLUSH) 0.9 %
5-40 SYRINGE (ML) INJECTION EVERY 8 HOURS
Status: DISCONTINUED | OUTPATIENT
Start: 2022-02-09 | End: 2022-02-09 | Stop reason: HOSPADM

## 2022-02-09 RX ORDER — LIDOCAINE HYDROCHLORIDE 10 MG/ML
0.1 INJECTION, SOLUTION EPIDURAL; INFILTRATION; INTRACAUDAL; PERINEURAL AS NEEDED
Status: DISCONTINUED | OUTPATIENT
Start: 2022-02-09 | End: 2022-02-09 | Stop reason: HOSPADM

## 2022-02-09 RX ORDER — MIDAZOLAM HYDROCHLORIDE 1 MG/ML
INJECTION, SOLUTION INTRAMUSCULAR; INTRAVENOUS AS NEEDED
Status: DISCONTINUED | OUTPATIENT
Start: 2022-02-09 | End: 2022-02-09 | Stop reason: HOSPADM

## 2022-02-09 RX ORDER — HYDROCODONE BITARTRATE AND ACETAMINOPHEN 5; 325 MG/1; MG/1
1 TABLET ORAL
Qty: 10 TABLET | Refills: 0 | Status: SHIPPED | OUTPATIENT
Start: 2022-02-09 | End: 2022-02-14

## 2022-02-09 RX ORDER — SODIUM CHLORIDE, SODIUM LACTATE, POTASSIUM CHLORIDE, CALCIUM CHLORIDE 600; 310; 30; 20 MG/100ML; MG/100ML; MG/100ML; MG/100ML
125 INJECTION, SOLUTION INTRAVENOUS CONTINUOUS
Status: DISCONTINUED | OUTPATIENT
Start: 2022-02-09 | End: 2022-02-09 | Stop reason: HOSPADM

## 2022-02-09 RX ORDER — FLUMAZENIL 0.1 MG/ML
0.2 INJECTION INTRAVENOUS
Status: DISCONTINUED | OUTPATIENT
Start: 2022-02-09 | End: 2022-02-09 | Stop reason: HOSPADM

## 2022-02-09 RX ORDER — LIDOCAINE HYDROCHLORIDE 20 MG/ML
INJECTION, SOLUTION INFILTRATION; PERINEURAL AS NEEDED
Status: DISCONTINUED | OUTPATIENT
Start: 2022-02-09 | End: 2022-02-09 | Stop reason: HOSPADM

## 2022-02-09 RX ORDER — FENTANYL CITRATE 50 UG/ML
INJECTION, SOLUTION INTRAMUSCULAR; INTRAVENOUS AS NEEDED
Status: DISCONTINUED | OUTPATIENT
Start: 2022-02-09 | End: 2022-02-09 | Stop reason: HOSPADM

## 2022-02-09 RX ORDER — HYDROMORPHONE HYDROCHLORIDE 1 MG/ML
.25-1 INJECTION, SOLUTION INTRAMUSCULAR; INTRAVENOUS; SUBCUTANEOUS
Status: DISCONTINUED | OUTPATIENT
Start: 2022-02-09 | End: 2022-02-09 | Stop reason: HOSPADM

## 2022-02-09 RX ORDER — SODIUM CHLORIDE, SODIUM LACTATE, POTASSIUM CHLORIDE, CALCIUM CHLORIDE 600; 310; 30; 20 MG/100ML; MG/100ML; MG/100ML; MG/100ML
100 INJECTION, SOLUTION INTRAVENOUS CONTINUOUS
Status: DISCONTINUED | OUTPATIENT
Start: 2022-02-09 | End: 2022-02-09 | Stop reason: HOSPADM

## 2022-02-09 RX ORDER — BUPIVACAINE HYDROCHLORIDE AND EPINEPHRINE 5; 5 MG/ML; UG/ML
30 INJECTION, SOLUTION EPIDURAL; INTRACAUDAL; PERINEURAL
Status: COMPLETED | OUTPATIENT
Start: 2022-02-09 | End: 2022-02-09

## 2022-02-09 RX ORDER — NALOXONE HYDROCHLORIDE 0.4 MG/ML
0.2 INJECTION, SOLUTION INTRAMUSCULAR; INTRAVENOUS; SUBCUTANEOUS
Status: DISCONTINUED | OUTPATIENT
Start: 2022-02-09 | End: 2022-02-09 | Stop reason: HOSPADM

## 2022-02-09 RX ORDER — DIPHENHYDRAMINE HYDROCHLORIDE 50 MG/ML
12.5 INJECTION, SOLUTION INTRAMUSCULAR; INTRAVENOUS AS NEEDED
Status: DISCONTINUED | OUTPATIENT
Start: 2022-02-09 | End: 2022-02-09 | Stop reason: HOSPADM

## 2022-02-09 RX ORDER — SODIUM CHLORIDE 0.9 % (FLUSH) 0.9 %
5-40 SYRINGE (ML) INJECTION AS NEEDED
Status: DISCONTINUED | OUTPATIENT
Start: 2022-02-09 | End: 2022-02-09 | Stop reason: HOSPADM

## 2022-02-09 RX ORDER — DEXAMETHASONE SODIUM PHOSPHATE 4 MG/ML
INJECTION, SOLUTION INTRA-ARTICULAR; INTRALESIONAL; INTRAMUSCULAR; INTRAVENOUS; SOFT TISSUE AS NEEDED
Status: DISCONTINUED | OUTPATIENT
Start: 2022-02-09 | End: 2022-02-09 | Stop reason: HOSPADM

## 2022-02-09 RX ORDER — BUPIVACAINE HYDROCHLORIDE AND EPINEPHRINE 5; 5 MG/ML; UG/ML
INJECTION, SOLUTION EPIDURAL; INTRACAUDAL; PERINEURAL AS NEEDED
Status: DISCONTINUED | OUTPATIENT
Start: 2022-02-09 | End: 2022-02-09 | Stop reason: HOSPADM

## 2022-02-09 RX ORDER — OXYCODONE HYDROCHLORIDE 5 MG/1
5 TABLET ORAL AS NEEDED
Status: DISCONTINUED | OUTPATIENT
Start: 2022-02-09 | End: 2022-02-09 | Stop reason: HOSPADM

## 2022-02-09 RX ORDER — PROPOFOL 10 MG/ML
INJECTION, EMULSION INTRAVENOUS AS NEEDED
Status: DISCONTINUED | OUTPATIENT
Start: 2022-02-09 | End: 2022-02-09 | Stop reason: HOSPADM

## 2022-02-09 RX ADMIN — FENTANYL CITRATE 25 MCG: 50 INJECTION INTRAMUSCULAR; INTRAVENOUS at 12:07

## 2022-02-09 RX ADMIN — PROPOFOL 16 MG: 10 INJECTION, EMULSION INTRAVENOUS at 12:10

## 2022-02-09 RX ADMIN — ONDANSETRON HYDROCHLORIDE 4 MG: 2 SOLUTION INTRAMUSCULAR; INTRAVENOUS at 12:23

## 2022-02-09 RX ADMIN — FENTANYL CITRATE 50 MCG: 50 INJECTION INTRAMUSCULAR; INTRAVENOUS at 11:49

## 2022-02-09 RX ADMIN — LIDOCAINE HYDROCHLORIDE 80 MG: 20 INJECTION, SOLUTION INFILTRATION; PERINEURAL at 11:54

## 2022-02-09 RX ADMIN — DEXAMETHASONE SODIUM PHOSPHATE 8 MG: 4 INJECTION, SOLUTION INTRAMUSCULAR; INTRAVENOUS at 11:47

## 2022-02-09 RX ADMIN — PROPOFOL 150 MCG/KG/MIN: 10 INJECTION, EMULSION INTRAVENOUS at 12:00

## 2022-02-09 RX ADMIN — PROPOFOL 125 MCG/KG/MIN: 10 INJECTION, EMULSION INTRAVENOUS at 11:57

## 2022-02-09 RX ADMIN — HYDROMORPHONE HYDROCHLORIDE 0.5 MG: 1 INJECTION, SOLUTION INTRAMUSCULAR; INTRAVENOUS; SUBCUTANEOUS at 12:56

## 2022-02-09 RX ADMIN — PROPOFOL 16 MG: 10 INJECTION, EMULSION INTRAVENOUS at 12:00

## 2022-02-09 RX ADMIN — SODIUM CHLORIDE, POTASSIUM CHLORIDE, SODIUM LACTATE AND CALCIUM CHLORIDE 125 ML/HR: 600; 310; 30; 20 INJECTION, SOLUTION INTRAVENOUS at 11:41

## 2022-02-09 RX ADMIN — PROPOFOL 40 MG: 10 INJECTION, EMULSION INTRAVENOUS at 11:56

## 2022-02-09 RX ADMIN — FENTANYL CITRATE 25 MCG: 50 INJECTION INTRAMUSCULAR; INTRAVENOUS at 12:04

## 2022-02-09 RX ADMIN — MIDAZOLAM 2 MG: 1 INJECTION, SOLUTION INTRAMUSCULAR; INTRAVENOUS at 11:49

## 2022-02-09 NOTE — DISCHARGE INSTRUCTIONS
Discharge Instructions from Dr. Isabell Fonseca    Patient Discharge Instructions    Donya Marks / [id number] : 1975    Admitted 2022 Discharged: 2022   What to do at Home  Diet:Regular  Activity: As tolerated. No driving if taking pain medication. Okay to shower or take a bath. You may chose to wear a bra to sleep in for extra support for the next few days. Pain control: Ice pack 20 minutes of every hour until you go to bed tonight. You may use over-the-counter medication as needed (acetominophen, aspirin, ibuprofen). Tomorrow you may put a heat pack on the breast.  Dressing: The skin glue is waterproof. It is okay to wash normally at this site. If the glue is still present after 10 days you should peel it off. Follow up: If needed, call the office to make an appointment. [phone]. I will call with results within one week. Problems/Questions: Call Jordana Calles MD on my cell phone. [phone]          DISCHARGE SUMMARY from Your Nurse    PATIENT INSTRUCTIONS:    AFTER ANESTHESIA & SEDATION, and WHILE TAKING PAIN MEDICINE  After general anesthesia / intravenous sedation and the 24 hours following, and/or while taking prescription Opiates:  · Limit your activities  · Do not drive and operate hazardous machinery until you have been of all narcotics and sedatives for over 24 hours  · Do not make important personal or business decisions  · Do  not drink alcoholic beverages  · If you have not urinated within 8 hours after discharge, please contact your surgeon on call.         SIGNS OF INFECTION, THINGS TO REPORT TO YOUR DOCTOR  Report the following Signs of Infection or General Problems after surgery to your surgeon:  · Excessive pain, swelling, redness, drainage, pus or odor of or around the surgical area  · Fever/ temperature over 101; Temperature over 100 if on medications (chemotherapy or medicines which affect your ability to fight infections)  · Nausea and vomiting lasting longer than 4 hours or if unable to take medications  · Any signs of decreased circulation or nerve impairment to extremity: change in color, persistent  numbness, tingling, coldness or increase pain  · If you have any questions. GOOD HELP TO FIGHT AN INFECTION  Here are a few tip to help reduce the chance of getting an infection after surgery:   Wash Your Hands   Good handwashing is the most important thing you and your caregiver can do.  Wash before and after caring for any wounds. Dry your hand with a clean towel.  Wash with soap and water for at least 20 seconds. A TIP: sing the \"Happy Birthday\" song through one time while washing to help with the timing.  Use a hand  in between washings.  Shower   When your surgeon says it is OK to take a shower, use a new bar of antibacterial soap (if that is what you use, and keep that bar of soap ONLY for your use), or antibacterial body wash.  Use a clean wash cloth or sponge when you bathe.  Dry off with a clean towel  after every bath - be careful around any wounds, skin staples, sutures or surgical glue over/on wounds.  Do not enter swimming pools, hot tubs, lakes, rivers and/or ocean until wounds are healed and your doctor/surgeon says it is OK.  Use Clean Sheets   Sleep on freshly laundered sheets after your surgery.  Keep the surgery site covered with a clean, dry bandage (if instructed to do so). If the bandage becomes soiled, reapply a new, dry, clean bandage.  Do not allow pets to sleep with you while your wound is healing.  Lifestyle Modification and Controlling Your Blood Sugar   Smoking slows wound healing. Stop smoking and limit exposure to second-hand smoke.  High blood sugar slows wound healing.   Eat a well-balanced diet to provide proper nutrition while healing   Monitor your blood sugar (if you are a diabetic) and take your medications as you are suppose to so you can control you blood sugar after surgery. COUGH AND DEEP BREATHE  Breathing deep and coughing are very important exercises to do after surgery. Deep breathing and coughing open the little air tubes and air sacks in your lungs. You take deep breaths every day. You may not even notice - it is just something you do when you sigh or yawn. It is a natural exercise you do to keep these air passages open. After surgery, take deep breaths and cough, on purpose. Coughing and deep breathing help prevent bronchitis and pneumonia after surgery. If you had chest or belly surgery, use a pillow as a \"hug marge\" and hold it tightly to your chest or belly when you cough. DIRECTIONS:  1. Take 10 to 15 slow deep breaths every hour while awake. 2. Breathe in deeply, and hold it for 2 seconds. 3. Exhale slowly through puckered lips, like blowing up a balloon. 4. After every 4th or 5th deep breath, hug your pillow to your chest or belly and give a hard, deep cough. Yes, it will probably hurt if you had abdominal surgery. But doing this exercise is very important part of healing after surgery. Take your pain medicine to help you do this exercise without too much pain. ANKLE PUMPS    Ankle pumps increase the circulation of oxygenated blood to your lower extremities and decrease your risk for circulation problems such as blood clots. They also stretch the muscles, tendons and ligaments in your foot and ankle, and prevent joint contracture in the ankle and foot, especially after surgeries on the legs. It is important to do ankle pump exercises regularly after surgery because immobility increases your risk for developing a blood clot. Your doctor may also have you take an Aspirin for the next few days as well. If your doctor did not ask you to take an Aspirin, consult with him before starting Aspirin therapy on your own. The exercise is quite simple.      · Slowly point your foot forward, feeling the muscles on the top of your lower leg stretch, and hold this position for 5 seconds. · Next, pull your foot back toward you as far as possible, stretching the calf muscles, and hold that position for 5 seconds. · Repeat with the other foot. · Perform 10 repetitions every hour while awake for both ankles if possible (down and then up with the foot once is one repetition). You should feel gentle stretching of the muscles in your lower leg when doing this exercise. If you feel pain, or your range of motion is limited, don't push too hard. Only go the limit your joint and muscles will let you go. If you have increasing pain, progressively worsening leg warmth or swelling, STOP the exercise and call your doctor. Other Wound Care information:  [] No additional recommendations. Below is information on the medication(s) your doctor is prescribing for you: The maximum daily dose of acetaminophen was discussed with the patient. She was encouraged not to exceed 3,000 mg of acetaminophen during a 24 hour period and was asked to keep in mind that acetaminophen can also be found in many over-the-counter cold medications as well as narcotics that may be given for pain. The patient expresses understanding of these issues and questions were answered. 4 THINGS ABOUT PAIN MEDICINE I ALWAYS TALK ABOUT:  There are 4 side effects I always talk about for pain medications. 1. They make you sleepy and drowsy. Do not drive a car or operate machines while taking pain medication. Do not make any major decisions. Take a nap. Relax. Let your body recover from the affects of anesthesia and surgery. 2. Some people have quite a problem with itching and. ..  3. Nausea and/or vomiting. These are mention together because they are a related genetic issue; while some people experience these problems, others do not. These are expected and know side effects. Itching is caused by histamine release - practically all opiate medications can cause this. An over-the-counter anti-histamine can help. Over-the-counter Benadryl® (the generic drug name is diphenhydramine) can help, but may cause increased drowsiness which can be intensified by pain medications. Over-the-counter Claritin® (the generic drug name is loratadine) or Zyrtec® (the generic drug name is cetirizine) may be effective without as much drowsiness as with the Benadryl/diphenhydramine. If you have nausea, like the itching, practically all opioids can cause this. Hopefully your surgeon may have given you some medicine for nausea. If your surgeon did not give you anti-nausea medications, and you are experiencing nausea/vomiting that prevent you from drinking clear liquids, CALL HIM/HER and request them, especially if these issues seem to get worse after you leave the hospital.    4. Last but not least is the problem of constipation (not dean able to have a bowel movement - poop.)  All pain medicine can slow down the movement of food through the gut. The slower it goes, the worse it can be. This only adds insult to the injury of surgery. And if you had tummy surgery, like having your gall bladder removed or a hernia repair, YOU DO NOT WANT THIS PROBLEM. There are 4 things I recommend. · Drink lots of fluids. For healthy people with no heart problems, this means at least 64 ounces of liquids or more per day. For example, a Big Gulp® from [employer] is 32 ounces. So you need to drink at least 2  Big Gulp®'s of fluids every day. If you have heart problems you may not be able to do this. Talk to your doctor about what you should do to prevent constipation. · Drinking fruit juice like apple, pear, or prune juice gives you extra \"BANG\" for your beverage. These drinks are high in natural fiber.   If you are a diabetic, drink sugar-free fluids with fiber additives (see next 2 points.)  Avoid drinking extra fruit juice unless this is a regular part of your diet plan. · Eat extra fresh fruits and vegetables. · Add extra fiber-products. Fiber products like Metamucil®, Citrucel®, Miralax® or Benefiber® can help. These products are over-the-counter and you do not need a prescription from your doctor. If you have followed these recommendations and still have some difficulty having a good poop, take and over-the-counter stimulant like Dulcolax® (biscodyl)  or Senokot® (senna concentrate). These may help get things moving. Nico Gonzalez MEDICATION AND   SIDE EFFECT GUIDE    The [address] [place] [address] MEDICATION AND SIDE EFFECT GUIDE was provided to the PATIENT AND CARE PROVIDER. Information provided includes instruction about drug purpose and common side effects for the following medications:   Over-the-Counter TYLENOL (Acetaminophen) and/or MOTRIN (Ibuprofen) (follow package instructions) - call Dr Sonia Lennox if your pain is uncontrolled and require something else. Medication information added to discharge record on February 9, 2022 at 12:40 PM.      Some information we wish all of our patients to be familiar with and General Information for Healthy Lifestyle choices:    · Make a list of your current medications with your Primary Care Provider. · Update this list whenever your medications are discontinued, doses are changed, or new medications (including over-the-counter products like ibuprofen, vitamins, or herbal remedies) are added. · Carry medication information at all times in case of emergency situations      No smoking / No tobacco products / Avoid exposure to second hand smoke    Surgeon General's Warning:  Quitting smoking now greatly reduces serious risk to your health. Obesity, smoking, and sedentary lifestyle greatly increases your risk for illness. A healthy diet, regular physical exercise & weight monitoring are important for maintaining a healthy lifestyle.     A Note About Congestive Heart Failure: You may be retaining fluid if you have a history of heart failure or if you experience any of the following symptoms:      · Weight gain of 3 pounds or more overnight or 5 pounds in a week  · Increased swelling in our hands or feet  · Shortness of breath while lying flat in bed      Please call your doctor as soon as you notice any of these symptoms; do not wait until your next office visit. A Note About Strokes:  Recognize signs and symptoms of STROKE. The simple mnemonic, F.A.S.T., can help you remember signs of a stroke and what to do if you suspect a stroke is occuring to you or someone you are with:    F - Face looks uneven  A - Arms unable to move, or move evenly  S - Speech is slurred or non-existent  T - Time - CALL 911 as soon as signs and symptoms begin - DO NOT go to bed or wait to see if you get better - TIME IS BRAIN. Warning Signs of HEART ATTACK   Call 911 if you have these symptoms:     Chest discomfort. Most heart attacks involve discomfort in the center of the chest that lasts more than a few minutes, or that goes away and comes back. It can feel like uncomfortable pressure, squeezing, fullness, or pain.  Discomfort in other areas of the upper body. Symptoms can include pain or discomfort in one or both arms, the back, neck, jaw, or stomach.  Shortness of breath with or without chest discomfort.  Other signs may include breaking out in a cold sweat, nausea, or lightheadedness. Don't wait more than five minutes to call 911 - MINUTES MATTER! Fast action can save your life. Calling 911 is almost always the fastest way to get lifesaving treatment. Emergency Medical Services staff can begin treatment when they arrive -- up to an hour sooner than if someone gets to the hospital by car. Learning About Coronavirus [phone])  Coronavirus [phone]): Overview  What is coronavirus (COVID-19)? The coronavirus disease (COVID-19) is caused by a virus.  It is an illness that was first found in [place], [place], in December 2019. It has since spread worldwide. The virus can cause fever, cough, and trouble breathing. In severe cases, it can cause pneumonia and make it hard to breathe without help. It can cause death. Coronaviruses are a large group of viruses. They cause the common cold. They also cause more serious illnesses like Middle East respiratory syndrome (MERS) and severe acute respiratory syndrome (SARS). COVID-19 is caused by a novel coronavirus. That means it's a new type that has not been seen in people before. This virus spreads person-to-person through droplets from coughing and sneezing. It can also spread when you are close to someone who is infected. And it can spread when you touch something that has the virus on it, such as a doorknob or a tabletop. What can you do to protect yourself from coronavirus (COVID-19)? The best way to protect yourself from getting sick is to:  · Avoid areas where there is an outbreak. · Avoid contact with people who may be infected. · Wash your hands often with soap or alcohol-based hand sanitizers. · Avoid crowds and try to stay at least 6 feet away from other people. · Wash your hands often, especially after you cough or sneeze. Use soap and water, and scrub for at least 20 seconds. If soap and water aren't available, use an alcohol-based hand . · Avoid touching your mouth, nose, and eyes. What can you do to avoid spreading the virus to others? To help avoid spreading the virus to others:  · Cover your mouth with a tissue when you cough or sneeze. Then throw the tissue in the trash. · Use a disinfectant to clean things that you touch often. · Stay home if you are sick or have been exposed to the virus. Don't go to school, work, or public areas. And don't use public transportation. · If you are sick:  ? Leave your home only if you need to get medical care.  But call the doctor's office first so they know you're coming. And wear a face mask, if you have one.  ? If you have a face mask, wear it whenever you're around other people. It can help stop the spread of the virus when you cough or sneeze. ? Clean and disinfect your home every day. Use household  and disinfectant wipes or sprays. Take special care to clean things that you grab with your hands. These include doorknobs, remote controls, phones, and handles on your refrigerator and microwave. And don't forget countertops, tabletops, bathrooms, and computer keyboards. When to call for help  Call 911 anytime you think you may need emergency care. For example, call if:  · You have severe trouble breathing. (You can't talk at all.)  · You have constant chest pain or pressure. · You are severely dizzy or lightheaded. · You are confused or can't think clearly. · Your face and lips have a blue color. · You pass out (lose consciousness) or are very hard to wake up. Call your doctor now if you develop symptoms such as:  · Shortness of breath. · Fever. · Cough. If you need to get care, call ahead to the doctor's office for instructions before you go. Make sure you wear a face mask, if you have one, to prevent exposing other people to the virus. Where can you get the latest information? The following health organizations are tracking and studying this virus. Their websites contain the most up-to-date information. Fred Suzanne also learn what to do if you think you may have been exposed to the virus. · U.S. Centers for Disease Control and Prevention (CDC): The CDC provides updated news about the disease and travel advice. The website also tells you how to prevent the spread of infection. www.cdc.gov  · World Health Organization [facility]): WHO offers information about the virus outbreaks. WHO also has travel advice. www.who.int  Current as of: April 1, 2020               Content Version: 12.4  © 2006-2020 [employer].    Care instructions adapted under license by your healthcare professional. If you have questions about a medical condition or this instruction, always ask your healthcare professional. [place] [id number] any warranty or liability for your use of this information. AT THE COMPLETION OF DISCHARGE INSTRUCTION REVIEW, WE VERIFY:  The discharge information has been reviewed with the patient and caregiver. Questions have been asked and answered meeting patient and caregiver expectations. The patient and caregiver verbalized understanding. Your discharge nurse was Keon Jamison RN-BC       Board Certified - Pain Management      CONTENTS FOUND IN YOUR DISCHARGE ENVELOPE:  [x]     Surgeon and Hospital Discharge Instructions  [x]     [facility] Care Provider Card  []     Medication & Side Effect Guide            (your newly prescribed medications have been marked/highlighted showing the most common side effects from the classes of drugs on your prescriptions)  []     Medication Prescription(s) x 0 ( [] These have been sent electronically to your pharmacy by your surgeon,   - OR -       your surgeon has already provided these to you during a previous/pre-op office visit)  []     Pain block and/or block with On-Q Catheter from Anesthesia Service (information included in your instructions above)        []    [facility] Education Information  []     Physical Therapy Prescription  []     Follow-up Appointment Cards  []     Surgery-related Pictures/Media  []     Medical device information sheets/pamphlets from their    []     School/work excuse note. []     /parent work excuse note. The following personal items collected during your admission for safe keeping are returned to you:     Dental Appliance: Dental Appliances: None  Vi keara: Visual Aid: Glasses  Hearing Aid:    Jewelry: Jewelry: None  Clothing: Clothing: Footwear,Pants,Shirt,Undergarments  Other Valuables:  Other Valuables: None  Valuables sent to safe:

## 2022-02-09 NOTE — PERIOP NOTES
Reviewed discharge instructions with patients Daughter over phone, verbalized understanding.   Pt dressed and iv discontinued and patient was brought down to discharge area via wheelchair

## 2022-02-09 NOTE — ANESTHESIA PREPROCEDURE EVALUATION
Relevant Problems   No relevant active problems       Anesthetic History               Review of Systems / Medical History  Patient summary reviewed and pertinent labs reviewed    Pulmonary      Recent URI (COVID + 12/21)             Neuro/Psych         Psychiatric history (anxiety/depression- no meds )     Cardiovascular              Hyperlipidemia (no meds)    Exercise tolerance: >4 METS     GI/Hepatic/Renal     GERD (food related, no meds today)           Endo/Other  Within defined limits           Other Findings              Physical Exam    Airway  Mallampati: II  TM Distance: 4 - 6 cm  Neck ROM: normal range of motion   Mouth opening: Normal     Cardiovascular    Rhythm: regular  Rate: normal         Dental    Dentition: Caps/crowns  Comments: Caps on front teeth   Pulmonary  Breath sounds clear to auscultation               Abdominal         Other Findings            Anesthetic Plan    ASA: 2  Anesthesia type: MAC          Induction: Intravenous  Anesthetic plan and risks discussed with: Patient and Son / Daughter

## 2022-02-09 NOTE — PERIOP NOTES
PACU IN REPORT FROM ANESTHESIA    Verbal report received from   Zheng Flores   [] MD/DO-Anesthesiologist    [x] CRNA   [] with student    CHOICE ANESTHESIA:  [] GENERAL  [] TIVA  [x] MAC  [x] LOCAL  [] REGIONAL  [] SPINAL   [] EPIDURAL   **Note the anesthesia record for medications given intraoperatively. **           [] E.R.A.S. PROTOCOL    SURGICAL PROCEDURE: Procedure(s) (LRB):  RIGHT BREAST EXCISIONAL BIOPSY (Right)     SURGEON: Zachery Olszewski, MD.    Brief Initial Visual Assessment:    Patient Age: [] Infant(1-12mo)      []Pediatric(1-13yrs)    [] Adolescent(13-18yrs)    [x] Adult(18-65yrs)      []Geriatric Adult(>65yrs). Patient    [] Alert           []Calm & Cooperative      [] Anxious  Appearance: [x] Drowsy      [x] Sedated      [] Unresponsive     Oriented x  0            Airway:     [x] Patent                          [] Obstructs easily/Obstructed on arrival   [] \"Difficult Airway\" report by Anesthesia                        [] Airway improved with head/airway repositioning                       Airway Adjuncts Present: [] Oral Airway    [x] Nasal Trumpet    [] ETT    [] LMA  CRNA removed NPA on arrival           Respiratory  [x] Even   [] Labored   [] Shallow   [] Tachypnea   [] Bradypnea  Pattern:    [x] Non-Labored  [] VENT and/or respiratory assistance     being provided. Skin:     [x] Pink [] Dusky    [] Pale        [x] Warm    [] Hot [] Cool       [] Cold   [x]Dry [] Moist [] Diaphoretic     Membranes:  [x] Pink [] Pale       [x] Moist [] Dry     [] Crusty     Pain:   [x] No Acute Discomfort. 0  /10 Scale [] Verbal Numeric   [] Moderate Discomfort.     [] V.A.S. [] Acute Discomfort. [x] A.N.V.    [] Chronic-Issue Related Discomfort.   [] F.L.A.C.C. Note E-MAR for medications administered. []Faces, Kunz/Baker    Note assessments documented in flowsheets; any assessment variants to be found in comments or narrative perioperative nurse notes. Post-anesthesia care now assumed by [facility] BSN, RN-BC

## 2022-02-09 NOTE — OP NOTES
Wili Johansen [facility] [place] [facility]  [address] [facility] [address]    Name: Michael Mendoza  : 1975    Breast Biopsy   Operative Report    Date of Surgery:2022  Preoperative Diagnosis: RIGHT breast mass, [facility]  Postoperative Diagnosis: same  Surgeon: Dr Liliam Starr  Anesthesia: MAC  Procedure:  RIGHT breast excisional biopsy   Indication: RIGHT breast mass  Procedure in Detail:  The patient was sedated with IV sedation. The right breast was prepped and draped in the usual sterile manner. The mass was in the lower, outer quadrant, 7:00 periareolar. Ultrasound was used to identify the mass and biopsy clip. A 2.5 cm horizontal incision was made and the mass was excised using sharp dissection and electrocautery. The biopsy clip was seen at the anterior edge of the mass. An anterior margin was also removed. Both specimens were marked with sutures for orientation purposes and sent to Pathology. Hemostasis was controlled with electrocautery. The breast tissue was reapproximated with multiple interrupted 3-0 Vicryl sutures. The dermis was re-approximated with 3-0 vicryl suture and the skin was closed  with a running 4-0 Monocryl suture. The wound was dressed with skin glue. The patient was awakened and taken to the recovery room. Sponge, needle and instrument counts were reported to be correct.     Findings:  Clip in specimen    Estimated Blood Loss:  10 ml           Specimens:   ID Type Source Tests Collected by Time Destination   1 : Right Breast Excisional Biopsy Preservative Breast  Andrew Leary MD 2022 1211 Pathology   2 : Anterior Margin of Right Breast Excisional Biopsy Preservative Breast  Andrew Leary MD 2022 1213 Pathology      Complications: none  Implants: none  Assistant: Fadi Morton SA    Signed By: Bossman Munguia MD

## 2022-02-09 NOTE — H&P
History and Physical    Karan Valero is a 48 y.o. female. HPI NEW patient consult referred by Dr. Johan Browne for RIGHT breast mass which pt noticed 6 weeks ago. Pt had a mammogram /US and biopsy which shows PASH. Bruised from the biopsy. Valiant Elder interpreted.     Family History: Mother  from stomach cancer  2021    Father had thyroid cancer,  from other causes      [facility] Results (most recent):  Results from [place] encounter on 21     [facility] 3D RAFAEL W MAMMO RT DX INCL CAD     Narrative  ULTRASOUND-GUIDED RIGHT BREAST CORE BIOPSY WITH CLIP PLACEMENT AND DIGITAL  UNILATERAL RAFAEL SYNTHESIS RIGHT MAMMOGRAM:     INDICATION: Right breast 8:00 mass.     Mrs Sera Magana  placed LPO on the ultrasound table . Right breast skin cleansed with Chloro-prep and draped in sterile fashion. Local and deep tissue anesthesia  obtained with subcutaneous injection of 10 cc of 1% lidocaine with epinephrine. A small skin incision was made through which the 14-gauge Sertera needle was placed. Using ultrasound guidance 3, 14 gauge core biopsies were obtained and placed in formalin. . The biopsy needle was withdrawn. A microclip was placed  with the introducer, and introducer withdrawn. Hemostasis obtained. Wound cleansed and dressed. Estimated blood loss: 2 mL .     Images of the procedure saved in the PACs.     Post-procedural mammogram demonstrates clip at the expected site.     Mrs Sera Magana  tolerated the procedure well without immediate complications. She  was given written and verbal post-care instructions.     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 Histology and mammography/sonography are concordant.  Histology cannot be high  risk, recommend surgical referral for consideration of excision.     RESULT CODE: ACR BIRADS category 2 benign.     FOLLOWUP CODE: Surgical referral.          Past Medical History:   Diagnosis Date    Chronic pain       pain in stomach from gallbladder stones    Ill-defined condition       elevated cholesterol            Past Surgical History:   Procedure Laterality Date    HX GYN   2004     ectopic pregnancy    HX ORTHOPAEDIC   2001     fx R arm    HX OTHER SURGICAL         liposuction      OB History         1    Para   1    Term   1            AB        Living            SAB        IAB        Ectopic        Molar        Multiple        Live Births   1           Obstetric Comments   Menarche 13, LMP 12/10/21, # of children 1, age of 4st delivery 21, Hysterectomy/oophorectomy no/no, Breast bx yes RIGHT breast 19, history of breast feeding no, BCP no, Hormone therapy no             No family history on file. Social History           Tobacco Use    Smoking status: Never Smoker    Smokeless tobacco: Not on file   Substance Use Topics    Alcohol use: No      Prior to Admission medications    Not on File      No Known Allergies        Review of Systems   Constitutional: Positive for malaise/fatigue. HENT: Positive for hearing loss. Eyes: Positive for blurred vision. Gastrointestinal: Positive for abdominal pain and diarrhea. All other systems reviewed and are negative. Physical Exam  Constitutional:       Appearance: She is well-developed. Cardiovascular:      Rate and Rhythm: Normal rate and regular rhythm. Heart sounds: Normal heart sounds. Pulmonary:      Effort: Pulmonary effort is normal.      Breath sounds: Normal breath sounds. Chest:   Breasts: Breasts are symmetrical.      Right: Mass present. No nipple discharge, skin change, tenderness or supraclavicular adenopathy. Inverted nipple: 1.5cm round mobile mass 8:00. Left: No mass, nipple discharge, skin change, tenderness or supraclavicular adenopathy. Lymphadenopathy:      Cervical: No cervical adenopathy. Upper Body:      Right upper body: No supraclavicular adenopathy.       Left upper body: No supraclavicular adenopathy. Skin:     General: Skin is warm and dry. Neurological:      Mental Status: She is alert and oriented to person, place, and time.           ASSESSMENT and PLAN      ICD-10-CM ICD-9-CM     1. Breast mass, right  N63.10 611.72        Total time spent with patient: 30 minutes      RIGHT breast mass, [facility]      Discussed surgery and post operative expectations:  a. Outpatient surgery with sedation. b. Surgery will take about 45 minutes, then an hour in the recovery room. c. The wound is closed with dissolving sutures and skin glue.  It is okay to shower after surgery. d. Swelling and bruising are normal and can last up to 2 weeks. e. Shade Diaz resume normal activities the day after surgery, but may have 1-2 weeks of pain and/or fatigue  f. Pain is usually well-controlled with ibuprofen or acetaminophen.  Hydrocodone may be prescribed if needed. g.  Infection and bleeding are unlikely but possible complications.     Plan:  RIGHT breast excisional biopsy, January 2022

## 2022-02-09 NOTE — ANESTHESIA POSTPROCEDURE EVALUATION
Procedure(s):  RIGHT BREAST EXCISIONAL BIOPSY.     MAC    Anesthesia Post Evaluation      Multimodal analgesia: multimodal analgesia used between 6 hours prior to anesthesia start to PACU discharge  Patient location during evaluation: bedside  Patient participation: complete - patient participated  Level of consciousness: awake  Pain management: adequate  Airway patency: patent  Anesthetic complications: no  Cardiovascular status: acceptable  Respiratory status: acceptable  Hydration status: acceptable        INITIAL Post-op Vital signs:   Vitals Value Taken Time   /70 02/09/22 1325   Temp 36.4 °C (97.6 °F) 02/09/22 1305   Pulse 72 02/09/22 1325   Resp 13 02/09/22 1325   SpO2 100 % 02/09/22 1325

## 2022-02-15 ENCOUNTER — TELEPHONE (OUTPATIENT)
Dept: SURGERY | Age: 47
End: 2022-02-15

## 2022-03-03 ENCOUNTER — TELEPHONE (OUTPATIENT)
Dept: FAMILY PLANNING/WOMEN'S HEALTH CLINIC | Age: 47
End: 2022-03-03

## 2022-03-03 NOTE — TELEPHONE ENCOUNTER
Nurse called and spoke with patient to follow up after breast sx. Patient doing well, thankful for [employer]. Is aware to resume annual mammograms 12/2022.  Maxwell Henriquez RN

## 2023-01-10 NOTE — PATIENT INSTRUCTIONS
Breast Lumps: Care Instructions  Your Care Instructions  Breast lumps are common, especially in women between ages 27 and 48. Many women's breasts feel lumpy and tender before their menstrual period. Women also may have lumps when they are breastfeeding. Breast lumps may go away after menopause. All new breast lumps in women after menopause should be checked by a doctor. Although lumps may be normal for you, it is important to have your doctor check any lump or thickness that is not like the rest of your breast to make sure it is not cancer. A lump may be larger, harder, or different from the rest of your breast tissue. Follow-up care is a key part of your treatment and safety. Be sure to make and go to all appointments, and call your doctor if you are having problems. It's also a good idea to know your test results and keep a list of the medicines you take. How can you care for yourself at home? · Make an appointment to have a mammogram and other follow-up visits as recommended by your doctor. When should you call for help? Watch closely for changes in your health, and be sure to contact your doctor if:    · You do not get better as expected.     · Your breast has changed.     · You have pain in your breast.     · You have a discharge from your nipple.     · A breast lump changes or does not go away. Where can you learn more? Go to http://www.gray.com/  Enter Q928 in the search box to learn more about \"Breast Lumps: Care Instructions. \"  Current as of: February 11, 2021               Content Version: 13.0  © 2006-2021 [employer]. Care instructions adapted under license by [employer] (which disclaims liability or warranty for this information). If you have questions about a medical condition or this instruction, always ask your healthcare professional. [address] any warranty or liability for your use of this information. Azathioprine Pregnancy And Lactation Text: This medication is Pregnancy Category D and isn't considered safe during pregnancy. It is unknown if this medication is excreted in breast milk.

## 2023-01-23 ENCOUNTER — TELEPHONE (OUTPATIENT)
Dept: FAMILY PLANNING/WOMEN'S HEALTH CLINIC | Age: 48
End: 2023-01-23

## 2023-01-23 NOTE — TELEPHONE ENCOUNTER
Returned patient call that was interested in being re-screened for [facility]. Patient stated that she received a bill as well for a procedure she had in 2/2022. I informed patient that I will let the nurse aware. Patient was transferred to [facility] line. No further questions or concerns.

## 2023-04-20 ENCOUNTER — TRANSCRIBE ORDER (OUTPATIENT)
Dept: SCHEDULING | Age: 48
End: 2023-04-20

## 2023-04-20 DIAGNOSIS — Z12.31 VISIT FOR SCREENING MAMMOGRAM: Primary | ICD-10-CM

## 2023-04-27 ENCOUNTER — TELEPHONE (OUTPATIENT)
Dept: FAMILY PLANNING/WOMEN'S HEALTH CLINIC | Age: 48
End: 2023-04-27

## 2023-04-27 NOTE — TELEPHONE ENCOUNTER
Called patient to confirm upcoming appt. Patient did not answer and voicemail was left with appointment information.

## 2023-05-04 ENCOUNTER — OFFICE VISIT (OUTPATIENT)
Dept: FAMILY PLANNING/WOMEN'S HEALTH CLINIC | Age: 48
End: 2023-05-04

## 2023-05-04 ENCOUNTER — HOSPITAL ENCOUNTER (OUTPATIENT)
Dept: MAMMOGRAPHY | Age: 48
Discharge: HOME | End: 2023-05-04

## 2023-05-04 DIAGNOSIS — Z12.31 VISIT FOR SCREENING MAMMOGRAM: ICD-10-CM

## 2023-05-04 DIAGNOSIS — Z01.419 ENCOUNTER FOR WELL WOMAN EXAM: Primary | ICD-10-CM

## 2023-05-04 PROCEDURE — 77063 BREAST TOMOSYNTHESIS BI: CPT

## 2023-05-04 PROCEDURE — 77067 SCR MAMMO BI INCL CAD: CPT

## 2023-05-04 RX ORDER — GLUCOSAMINE SULFATE 1500 MG
POWDER IN PACKET (EA) ORAL DAILY
COMMUNITY

## 2023-05-04 RX ORDER — MAGNESIUM 250 MG
TABLET ORAL
COMMUNITY

## (undated) DEVICE — SOL IRRIGATION INJ NACL 0.9% 500ML BTL

## (undated) DEVICE — COVER US PRB W15XL120CM W/ GEL RUBBERBAND TAPE STRP FLD GEN

## (undated) DEVICE — ROCKER SWITCH PENCIL BLADE ELECTRODE, HOLSTER: Brand: EDGE

## (undated) DEVICE — GLOVE SURG SZ 65 THK91MIL LTX FREE SYN POLYISOPRENE

## (undated) DEVICE — CHEST PACK-SFMCASU: Brand: MEDLINE INDUSTRIES, INC.

## (undated) DEVICE — HYPODERMIC SAFETY NEEDLE: Brand: MONOJECT